# Patient Record
Sex: MALE | Race: WHITE | NOT HISPANIC OR LATINO | Employment: FULL TIME | URBAN - METROPOLITAN AREA
[De-identification: names, ages, dates, MRNs, and addresses within clinical notes are randomized per-mention and may not be internally consistent; named-entity substitution may affect disease eponyms.]

---

## 2018-04-14 ENCOUNTER — APPOINTMENT (OUTPATIENT)
Dept: LAB | Facility: CLINIC | Age: 63
End: 2018-04-14
Attending: NURSE PRACTITIONER

## 2018-04-14 ENCOUNTER — APPOINTMENT (OUTPATIENT)
Dept: URGENT CARE | Facility: CLINIC | Age: 63
End: 2018-04-14

## 2018-04-14 ENCOUNTER — TRANSCRIBE ORDERS (OUTPATIENT)
Dept: URGENT CARE | Facility: CLINIC | Age: 63
End: 2018-04-14

## 2018-04-14 DIAGNOSIS — Z02.1 PRE-EMPLOYMENT EXAMINATION: Primary | ICD-10-CM

## 2018-04-14 DIAGNOSIS — Z02.1 PRE-EMPLOYMENT EXAMINATION: ICD-10-CM

## 2018-04-14 LAB — RUBV IGG SERPL IA-ACNC: >175 IU/ML

## 2018-04-14 PROCEDURE — 86765 RUBEOLA ANTIBODY: CPT

## 2018-04-14 PROCEDURE — 86762 RUBELLA ANTIBODY: CPT

## 2018-04-14 PROCEDURE — 36415 COLL VENOUS BLD VENIPUNCTURE: CPT

## 2018-04-14 PROCEDURE — 86480 TB TEST CELL IMMUN MEASURE: CPT

## 2018-04-14 PROCEDURE — 86735 MUMPS ANTIBODY: CPT

## 2018-04-14 PROCEDURE — 86787 VARICELLA-ZOSTER ANTIBODY: CPT

## 2018-04-16 LAB
ANNOTATION COMMENT IMP: NORMAL
GAMMA INTERFERON BACKGROUND BLD IA-ACNC: 0.06 IU/ML
M TB IFN-G BLD-IMP: NEGATIVE
M TB IFN-G CD4+ BCKGRND COR BLD-ACNC: 0 IU/ML
M TB IFN-G CD4+ T-CELLS BLD-ACNC: 0.06 IU/ML
MITOGEN IGNF BLD-ACNC: 8.85 IU/ML
QUANTIFERON-TB GOLD IN TUBE: NORMAL
SERVICE CMNT-IMP: NORMAL

## 2018-04-17 LAB
MEV IGG SER QL: NORMAL
MUV IGG SER QL: NORMAL
VZV IGG SER IA-ACNC: NORMAL

## 2018-05-25 DIAGNOSIS — M25.512 CHRONIC LEFT SHOULDER PAIN: Primary | ICD-10-CM

## 2018-05-25 DIAGNOSIS — G89.29 CHRONIC LEFT SHOULDER PAIN: Primary | ICD-10-CM

## 2018-06-11 DIAGNOSIS — G89.29 CHRONIC SHOULDER PAIN, UNSPECIFIED LATERALITY: Primary | ICD-10-CM

## 2018-06-11 DIAGNOSIS — M25.519 CHRONIC SHOULDER PAIN, UNSPECIFIED LATERALITY: Primary | ICD-10-CM

## 2018-10-03 DIAGNOSIS — G47.30 SLEEP APNEA IN ADULT: Primary | ICD-10-CM

## 2018-10-10 ENCOUNTER — HOSPITAL ENCOUNTER (OUTPATIENT)
Dept: SLEEP CENTER | Facility: HOSPITAL | Age: 63
Discharge: HOME | End: 2018-10-10
Attending: INTERNAL MEDICINE
Payer: COMMERCIAL

## 2018-10-10 DIAGNOSIS — G47.33 OSA (OBSTRUCTIVE SLEEP APNEA): Primary | ICD-10-CM

## 2018-10-10 DIAGNOSIS — G47.33 OSA (OBSTRUCTIVE SLEEP APNEA): ICD-10-CM

## 2018-10-10 PROCEDURE — 95810 POLYSOM 6/> YRS 4/> PARAM: CPT

## 2018-10-11 NOTE — PROGRESS NOTES
Sleep Study Documentation    Pre-Sleep Study       Sleep testing procedure explained to patient:YES    Patient napped prior to study:NO    Caffeine:Dayshift worker after 12PM   Caffeine use:NO    Alcohol:Dayshift workers after 5PM: Alcohol use:NO    Typical day for patient:YES       Study Documentation  Diagnostic   Snore:Mild  Supplemental O2: no    O2 flow rate (L/min) range   O2 flow rate (L/min) final   Minimum SaO2 80  Baseline SaO2  93        Mode of Therapy:    EKG abnormalities: no     EEG abnormalities: no    Study Terminated:no    Patient classification: employed       Post-Sleep Study    Medication used at bedtime or during sleep study:YES over the counter sleep aid    Patient reports time it took to fall asleep:greater than 60 minutes    Patient reports waking up during study:3 or more times  Patient reports returning to sleep in greater than 30 minutes  Patient reports sleeping 4 to 6 hours with dreaming  Patient reports sleep during study:typical    Patient rated sleepiness: Somewhat sleepy or tired    PAP treatment:no

## 2018-10-12 ENCOUNTER — TRANSCRIBE ORDERS (OUTPATIENT)
Dept: SLEEP CENTER | Facility: CLINIC | Age: 63
End: 2018-10-12

## 2018-10-12 ENCOUNTER — TELEPHONE (OUTPATIENT)
Dept: SLEEP CENTER | Facility: CLINIC | Age: 63
End: 2018-10-12

## 2018-10-12 NOTE — TELEPHONE ENCOUNTER
----- Message from Cirilo Wooten MD sent at 10/11/2018 11:20 AM EDT -----  Approved for diagnostic study  ----- Message -----  From: Suzy Arceo  Sent: 10/10/2018  10:37 AM  To: Sleep Medicine Lexy Leon, #    PLEASE REVIEW FOR APPROVAL OR DENIAL AND WHY

## 2018-10-15 ENCOUNTER — TELEPHONE (OUTPATIENT)
Dept: SLEEP CENTER | Facility: CLINIC | Age: 63
End: 2018-10-15

## 2018-10-15 NOTE — TELEPHONE ENCOUNTER
I spoke with pt, advised that sleep study shows mild sleep apnea  Advised Dr Randa Miranda recommends OV to discuss tx options  Scheduled 11/21/18 at 1300 at the [facility]

## 2018-10-15 NOTE — TELEPHONE ENCOUNTER
----- Message from Catrachito Ballard MD sent at 10/11/2018  8:51 AM EDT -----  Follow-up in sleep clinic    Thanks

## 2018-10-16 NOTE — TELEPHONE ENCOUNTER
Attempted to contact pt, LMOM to CB    Can get pt scheduled in the [facility] office 10/30 at 130 pm with Dr Gracia if pt available

## 2018-10-18 ENCOUNTER — OFFICE VISIT (OUTPATIENT)
Dept: SLEEP CENTER | Facility: CLINIC | Age: 63
End: 2018-10-18
Payer: COMMERCIAL

## 2018-10-18 ENCOUNTER — TELEPHONE (OUTPATIENT)
Dept: SLEEP CENTER | Facility: CLINIC | Age: 63
End: 2018-10-18

## 2018-10-18 VITALS
HEIGHT: 67 IN | WEIGHT: 170 LBS | BODY MASS INDEX: 26.68 KG/M2 | DIASTOLIC BLOOD PRESSURE: 76 MMHG | HEART RATE: 72 BPM | SYSTOLIC BLOOD PRESSURE: 112 MMHG

## 2018-10-18 DIAGNOSIS — G47.33 OSA (OBSTRUCTIVE SLEEP APNEA): Primary | ICD-10-CM

## 2018-10-18 DIAGNOSIS — G25.81 RLS (RESTLESS LEGS SYNDROME): ICD-10-CM

## 2018-10-18 PROCEDURE — 99244 OFF/OP CNSLTJ NEW/EST MOD 40: CPT | Performed by: INTERNAL MEDICINE

## 2018-10-18 RX ORDER — PRAMIPEXOLE DIHYDROCHLORIDE 0.25 MG/1
TABLET ORAL
Qty: 90 TABLET | Refills: 2 | Status: SHIPPED | OUTPATIENT
Start: 2018-10-18

## 2018-10-18 NOTE — TELEPHONE ENCOUNTER
Andre Roman from [facility] called, states pt came to facility today with script for auto CPAP  Andre Roman states she will need copy of office notes and study results faxed to [phone] and [phone]    Information faxed as requested to both #'s above

## 2018-10-18 NOTE — PROGRESS NOTES
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 and is anxious to be as alert as possible  Plan:  Start APAP 4 to 20 cm  The patient will get a machine through a [facility] in [place]  Start pramipexole 0 125 mg titrated to 0 75 mg as needed    Follow up: Two months for compliance check    History of Present Illness:   58 y o male with a longstanding history of loud snoring  He underwent uvulopalatopharyngoplasty approximately 15 years ago and his snoring improved  He has had worsening daytime sleepiness over the last several years  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, 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  He denies drowsiness that interferes with work or driving  His sleep hygiene is unremarkable  He underwent diagnostic polysomnography which demonstrated AHI = 8 8, which was much higher during REM at 54 3  Minimum oxygen saturation was 80% with 7% of the study spent below 90% saturation  The patient has also tried a mandibular advancement device, which was of limited benefit      Review of Systems:      Genitourinary none   Cardiology none   Gastrointestinal none   Neurology need to move extremities, forgetfulness, poor concentration or confusion,  and difficulty with memory   Constitutional fatigue   Integumentary none   Psychiatry none   Musculoskeletal legs twitching/jerking   Pulmonary none   ENT none   Endocrine none   Hematological none Historical Information    Past Medical History:  Hypertension      Family History: non-contributory      Sleep Schedule: unremarkable    Snoring:    Yes      Witnessed Apnea:    No    Medications/Allergies:    Current Outpatient Prescriptions:     pramipexole (MIRAPEX) 0 25 mg tablet, 1/2 to 3 tablets by mouth 1 hour before bed, Disp: 90 tablet, Rfl: 2        No notes on file                  Objective:    Vital Signs:   Vitals:    10/18/18 0800   BP: 112/76   Pulse: 72   Weight: 77 1 kg (170 lb)   Height: 5' 6 5" (1 689 m)     Neck Circumference: 40 5      [place] Sleepiness Scale: Total score: 7    Physical Exam:    General: Alert, appropriate, cooperative, overweight    Head: NC/AT, no retrognathia    Nose: No septal deviation, nares not obstructed, mucosa normal    Throat: Airway lumen narrowed, tongue base thickened, no tonsils visualized    Neck: Normal, no thyromegaly or lymphadenopathy, no JVD    Heart: RR, normal S1 and S2, no murmurs    Chest: Clear bilaterally    Extremity: No clubbing, cyanosis, no edema    Skin: Warm, dry    Neuro: No motor abnormalities, cranial nerves appear intact    Sleep Study Results:   AHI = 8 8  PAP Pressure: Auto PAP: 4 to 20 cm  DME Provider:   Patient preference    Counseling / Coordination of Care  Total clinic time spent today 30 minutes  A description of the counseling / coordination of care: We discussed the pathophysiology of obstructive sleep apnea as well as the possible treatment options  We also discussed RLS and PLMD           Kaleen Lefort, M D    Board Certified Sleep Specialist

## 2018-10-22 NOTE — TELEPHONE ENCOUNTER
I received PPW for a new start for APAP  PT went to [facility]  The AUTH through [address]  will be canceled

## 2019-01-17 ENCOUNTER — OFFICE VISIT (OUTPATIENT)
Dept: SLEEP CENTER | Facility: CLINIC | Age: 64
End: 2019-01-17
Payer: COMMERCIAL

## 2019-01-17 VITALS
WEIGHT: 170 LBS | BODY MASS INDEX: 26.68 KG/M2 | HEIGHT: 67 IN | SYSTOLIC BLOOD PRESSURE: 124 MMHG | DIASTOLIC BLOOD PRESSURE: 82 MMHG | HEART RATE: 91 BPM

## 2019-01-17 DIAGNOSIS — G47.33 OSA (OBSTRUCTIVE SLEEP APNEA): ICD-10-CM

## 2019-01-17 DIAGNOSIS — F51.04 PSYCHOPHYSIOLOGICAL INSOMNIA: Primary | ICD-10-CM

## 2019-01-17 DIAGNOSIS — G25.81 RESTLESS LEG SYNDROME: ICD-10-CM

## 2019-01-17 PROCEDURE — 99214 OFFICE O/P EST MOD 30 MIN: CPT | Performed by: INTERNAL MEDICINE

## 2019-01-17 RX ORDER — ZOLPIDEM TARTRATE 3.5 MG/1
TABLET SUBLINGUAL
Qty: 30 TABLET | Refills: 2 | Status: SHIPPED | OUTPATIENT
Start: 2019-01-17 | End: 2021-10-15

## 2019-01-17 NOTE — PROGRESS NOTES
Progress Note - Sleep Center   Lyndon Gonzalez FMZ:[birth year] MRN: [id number]      Reason for Visit:  58 y o male here for PAP compliance check    Assessment:  Doing well with new PAP device  Sleep quality is improved and the patient feels less drowsy  Compliance data is not available from [facility]  RLS is better on pramipexole 0 75 mg  Plan:  Adequate compliance and successful treatment  The patient has some element of sleep maintenance insomnia for which I will start Intermezzo  Follow up: One year    History of Present Illness:  History of LINDSAY on PAP therapy  Meets adequate compliance  Review of Systems      Genitourinary none   Cardiology none   Gastrointestinal none   Neurology none   Constitutional none   Integumentary none   Psychiatry none   Musculoskeletal none   Pulmonary none   ENT none   Endocrine none   Hematological none           I have reviewed and updated the review of systems as necessary    Historical Information    Past Medical History: History reviewed  No pertinent past medical history  Past Surgical History: History reviewed  No pertinent surgical history  Family History: History reviewed  No pertinent family history  Medications/Allergies:      Current Outpatient Prescriptions:     pramipexole (MIRAPEX) 0 25 mg tablet, 1/2 to 3 tablets by mouth 1 hour before bed, Disp: 90 tablet, Rfl: 2      Objective    Vital Signs:   Vitals:    01/17/19 1100   BP: 124/82   Pulse: 91     [place] Sleepiness Scale: Total score: 0        Physical Exam:    General: Alert, appropriate, cooperative, overweight    Head: NC/AT    Skin: Warm, dry    Neuro: No motor abnormalities, cranial nerves appear intact    Extremity: No clubbing, cyanosis    PAP setting:   APAP 4-20 cm  DME Provider:  [facility]   Test results:  AHI=8 8    Counseling / Coordination of Care  Total clinic time spent today 20 min   A description of the counseling / coordination of care: Discussed sleep TOAN Herman    Board Certified Sleep Specialist

## 2019-01-22 DIAGNOSIS — F51.04 PSYCHOPHYSIOLOGICAL INSOMNIA: Primary | ICD-10-CM

## 2019-01-22 RX ORDER — ZALEPLON 5 MG/1
CAPSULE ORAL
Qty: 30 CAPSULE | Refills: 2 | Status: SHIPPED | OUTPATIENT
Start: 2019-01-22

## 2021-10-15 ENCOUNTER — TELEMEDICINE (OUTPATIENT)
Dept: FAMILY MEDICINE CLINIC | Facility: CLINIC | Age: 66
End: 2021-10-15
Payer: COMMERCIAL

## 2021-10-15 DIAGNOSIS — U07.1 COVID-19: Primary | ICD-10-CM

## 2021-10-15 DIAGNOSIS — G47.33 OSA (OBSTRUCTIVE SLEEP APNEA): ICD-10-CM

## 2021-10-15 PROCEDURE — 1160F RVW MEDS BY RX/DR IN RCRD: CPT | Performed by: FAMILY MEDICINE

## 2021-10-15 PROCEDURE — 99203 OFFICE O/P NEW LOW 30 MIN: CPT | Performed by: FAMILY MEDICINE

## 2021-10-15 PROCEDURE — 1036F TOBACCO NON-USER: CPT | Performed by: FAMILY MEDICINE

## 2025-03-11 ENCOUNTER — OFFICE VISIT (OUTPATIENT)
Dept: FAMILY MEDICINE CLINIC | Facility: HOSPITAL | Age: 70
End: 2025-03-11
Payer: COMMERCIAL

## 2025-03-11 VITALS
OXYGEN SATURATION: 97 % | DIASTOLIC BLOOD PRESSURE: 100 MMHG | HEART RATE: 111 BPM | SYSTOLIC BLOOD PRESSURE: 150 MMHG | HEIGHT: 66 IN | WEIGHT: 164.2 LBS | BODY MASS INDEX: 26.39 KG/M2 | TEMPERATURE: 98.1 F

## 2025-03-11 DIAGNOSIS — Z00.00 ANNUAL PHYSICAL EXAM: Primary | ICD-10-CM

## 2025-03-11 DIAGNOSIS — Z13.6 SCREENING FOR CARDIOVASCULAR CONDITION: ICD-10-CM

## 2025-03-11 DIAGNOSIS — Z12.5 SCREENING FOR PROSTATE CANCER: ICD-10-CM

## 2025-03-11 DIAGNOSIS — Z76.89 ENCOUNTER TO ESTABLISH CARE: ICD-10-CM

## 2025-03-11 PROCEDURE — 99387 INIT PM E/M NEW PAT 65+ YRS: CPT

## 2025-03-11 NOTE — PROGRESS NOTES
Adult Annual Physical  Name: Tony Arechiga      : 1955      MRN: [id number]  Encounter Provider: Justyna Salcedo DO  Encounter Date: 3/11/2025   Encounter department: [facility] [address]    Assessment & Plan  Annual physical exam         Encounter to establish care         Screening for cardiovascular condition    Orders:    Lipid panel; Future    Hemoglobin A1c (w/out EAG); Future    Screening for prostate cancer    Orders:    PSA, Total Screen; Future    Preventive Screenings:  - Diabetes Screening: orders placed  - Cholesterol Screening: orders placed   - Cervical cancer screening: screening not indicated   - Colon cancer screening: screening up-to-date   - Lung cancer screening: screening not indicated   - Prostate cancer screening: orders placed   - AAA screening: screening not indicated     Immunizations:    - The patient declines recommended vaccines currently despite my recommendations           History of Present Illness     Adult Annual Physical:  Patient presents for annual physical. No other concerns at this time    Health Evaluation for 70+yo providers  - Not using hearing aids or glasses (for reading only)    HTN  - Has taken BP at home and in [id number] usually  - Asymptomatic at this time.     Diet and Physical Activity:  - Diet/Nutrition: well balanced diet.  - Exercise: 5-7 times a week on average. active recovery    Depression Screening:  - PHQ-2 Score: 0    General Health:  - Sleep: 7-8 hours of sleep on average.  - Hearing: normal hearing bilateral ears.  - Vision: wears glasses. for reading  - Dental: regular dental visits.     Health:  - History of STDs: no.   - Urinary symptoms: none.     Preventative Screening  Colon: Last colonoscopy age 54, last Cologuard  negative  Prostate: ordered  Reports Family history of breast cancer. Denies fam h/o  colon cancer, prostate cancer  Labs: Hgb [id number], FLP ordered    Immunizations  Last TDAP  "denies  Shingles 2018  Last flu declines  COVID denies      Wt Readings from Last 3 Encounters:   03/11/25 74.5 kg (164 lb 3.2 oz)   01/17/19 77.1 kg (170 lb)   10/18/18 77.1 kg (170 lb)       PHQ-2/9 Depression Screening    Little interest or pleasure in doing things: 0 - not at all  Feeling down, depressed, or hopeless: 0 - not at all  PHQ-2 Score: 0  PHQ-2 Interpretation: Negative depression screen           Family History  reviewed    Allergies  Seasonal allergies & erythromycin    Social History  Tobacco - denies history  Alcohol - denies  Illicit Drugs - denies      Review of Systems   Respiratory:  Negative for shortness of breath.    Cardiovascular:  Negative for chest pain.   Gastrointestinal:  Negative for abdominal pain, nausea and vomiting.   Skin:  Negative for rash.   Allergic/Immunologic: Negative for environmental allergies and food allergies.   Psychiatric/Behavioral:  Negative for sleep disturbance.      Current Outpatient Medications on File Prior to Visit   Medication Sig Dispense Refill    [DISCONTINUED] pramipexole (MIRAPEX) 0.25 mg tablet 1/2 to 3 tablets by mouth 1 hour before bed 90 tablet 2    [DISCONTINUED] zaleplon (SONATA) 5 MG capsule One by mouth during the night as needed for sleep maintenance insomnia 30 capsule 2     No current facility-administered medications on file prior to visit.      Social History     Tobacco Use    Smoking status: Never    Smokeless tobacco: Never   Substance and Sexual Activity    Alcohol use: Never    Drug use: Never    Sexual activity: Yes     Partners: Female       Objective   /100   Pulse (!) 111   Temp 98.1 °F (36.7 °C) (Tympanic)   Ht 5' 6\" (1.676 m)   Wt 74.5 kg (164 lb 3.2 oz)   SpO2 97%   BMI 26.50 kg/m²     Physical Exam  Vitals reviewed.   Constitutional:       General: He is not in acute distress.     Appearance: Normal appearance. He is not ill-appearing.   HENT:      Head: Normocephalic and atraumatic.      Right Ear: Tympanic " membrane, ear canal and external ear normal. There is no impacted cerumen.      Left Ear: Tympanic membrane, ear canal and external ear normal. There is no impacted cerumen.      Nose: Nose normal.      Mouth/Throat:      Mouth: Mucous membranes are moist.      Pharynx: No posterior oropharyngeal erythema.   Cardiovascular:      Rate and Rhythm: Normal rate and regular rhythm.      Heart sounds: Normal heart sounds.   Pulmonary:      Effort: Pulmonary effort is normal.      Breath sounds: Normal breath sounds.   Abdominal:      General: Bowel sounds are normal.      Palpations: Abdomen is soft.      Tenderness: There is no abdominal tenderness. There is no guarding or rebound.   Skin:     General: Skin is warm and dry.   Neurological:      Mental Status: He is alert.      Comments: MMSE score 30   Psychiatric:         Mood and Affect: Mood normal.         Behavior: Behavior normal.           Justyna Salcedo,   Family Medicine

## 2025-03-14 ENCOUNTER — RESULTS FOLLOW-UP (OUTPATIENT)
Dept: URGENT CARE | Facility: CLINIC | Age: 70
End: 2025-03-14

## 2025-03-14 LAB
25(OH)D3 SERPL-MCNC: 95 NG/ML (ref 30–100)
CHOLEST SERPL-MCNC: 221 MG/DL
CHOLEST/HDLC SERPL: 3.7 (CALC)
HBA1C MFR BLD: 6.2 % OF TOTAL HGB
HDLC SERPL-MCNC: 59 MG/DL
LDLC SERPL CALC-MCNC: 138 MG/DL (CALC)
NONHDLC SERPL-MCNC: 162 MG/DL (CALC)
PSA SERPL-MCNC: 4.42 NG/ML
TRIGL SERPL-MCNC: 118 MG/DL